# Patient Record
Sex: FEMALE | Race: OTHER | HISPANIC OR LATINO | URBAN - METROPOLITAN AREA
[De-identification: names, ages, dates, MRNs, and addresses within clinical notes are randomized per-mention and may not be internally consistent; named-entity substitution may affect disease eponyms.]

---
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
## 2019-10-21 ENCOUNTER — EMERGENCY (EMERGENCY)
Facility: HOSPITAL | Age: 19
LOS: 1 days | Discharge: HOME | End: 2019-10-21
Attending: EMERGENCY MEDICINE
Payer: MEDICAID

## 2019-10-21 VITALS
HEIGHT: 67 IN | OXYGEN SATURATION: 100 % | WEIGHT: 154.1 LBS | TEMPERATURE: 98 F | SYSTOLIC BLOOD PRESSURE: 106 MMHG | RESPIRATION RATE: 20 BRPM | HEART RATE: 77 BPM | DIASTOLIC BLOOD PRESSURE: 70 MMHG

## 2019-10-21 LAB
APPEARANCE UR: CLEAR — SIGNIFICANT CHANGE UP
BILIRUB UR-MCNC: NEGATIVE — SIGNIFICANT CHANGE UP
COLOR SPEC: YELLOW — SIGNIFICANT CHANGE UP
DIFF PNL FLD: NEGATIVE — SIGNIFICANT CHANGE UP
GLUCOSE UR QL: NEGATIVE MG/DL — SIGNIFICANT CHANGE UP
HCG UR QL: NEGATIVE — SIGNIFICANT CHANGE UP
KETONES UR-MCNC: NEGATIVE — SIGNIFICANT CHANGE UP
LEUKOCYTE ESTERASE UR-ACNC: NEGATIVE — SIGNIFICANT CHANGE UP
NITRITE UR-MCNC: NEGATIVE — SIGNIFICANT CHANGE UP
PH UR: 8 — SIGNIFICANT CHANGE UP (ref 5–8)
PROT UR-MCNC: NEGATIVE MG/DL — SIGNIFICANT CHANGE UP
SP GR SPEC: 1.01 — SIGNIFICANT CHANGE UP (ref 1.01–1.02)
UROBILINOGEN FLD QL: NEGATIVE MG/DL — SIGNIFICANT CHANGE UP

## 2019-10-21 PROCEDURE — 99283 EMERGENCY DEPT VISIT LOW MDM: CPT

## 2019-10-21 PROCEDURE — T1013: CPT

## 2019-10-21 PROCEDURE — 81003 URINALYSIS AUTO W/O SCOPE: CPT

## 2019-10-21 PROCEDURE — 81025 URINE PREGNANCY TEST: CPT

## 2019-10-21 RX ORDER — MECLIZINE HCL 12.5 MG
25 TABLET ORAL ONCE
Refills: 0 | Status: COMPLETED | OUTPATIENT
Start: 2019-10-21 | End: 2019-10-21

## 2019-10-21 RX ORDER — ONDANSETRON 8 MG/1
4 TABLET, FILM COATED ORAL ONCE
Refills: 0 | Status: COMPLETED | OUTPATIENT
Start: 2019-10-21 | End: 2019-10-21

## 2019-10-21 RX ADMIN — Medication 25 MILLIGRAM(S): at 12:46

## 2019-10-21 RX ADMIN — ONDANSETRON 4 MILLIGRAM(S): 8 TABLET, FILM COATED ORAL at 12:46

## 2019-10-21 NOTE — ED STATDOCS - PROGRESS NOTE DETAILS
Periorbital Skin Units: 12 PA NOTE: Pt seen by intake physician and HPI/ROS/PE/MDM reviewed. Pt seen and evaluated. Discussed plan and any resulted studies at this time. Will continue to monitor and re-evaluate. UA negative, symptoms resolved and no pelvic tenderness on reexamination. Patient to be discharged from ED. Any available test results were discussed with patient and/or family. Verbal instructions given, including instructions to return to ED immediately for any new, worsening, or concerning symptoms. Patient endorsed understanding. Written discharge instructions additionally given, including follow-up plan.

## 2019-10-21 NOTE — ED STATDOCS - PATIENT PORTAL LINK FT
You can access the FollowMyHealth Patient Portal offered by [facility] by registering at the following website: [email or web address]. By joining [employer]’s FollowMyHealth portal, you will also be able to view your health information using other applications (apps) compatible with our system.

## 2019-10-21 NOTE — ED STATDOCS - CARE PROVIDER_API CALL
Eva Smith)  Obstetrics and Gynecology  [address] [place] [address]  Phone: [phone]  Fax: [phone]  Follow Up Time: 1-3 Days

## 2019-10-21 NOTE — ED STATDOCS - CLINICAL SUMMARY MEDICAL DECISION MAKING FREE TEXT BOX
1. Vertigo, likely periphreal. Will treat with Meclizine.  2. Dysuria. Will check UA 1. Vertigo, likely periphreal. Will treat with Meclizine.  2. Dysuria. Will check UA  3. epigastric buring sensation: likely gastritis. dietary modfications suggested and recommended H2 blocker

## 2019-10-21 NOTE — ED STATDOCS - NSFOLLOWUPINSTRUCTIONS_ED_ALL_ED_FT
Please follow up with your primary care physician within 24-72 hours and return immediately if symptoms worsen.    Pelvic Pain, Female    Female pelvic pain can be caused by many different things and start from a variety of places. Pelvic pain refers to pain that is located in the lower half of the abdomen and between your hips. The pain may occur over a short period of time (acute) or may be reoccurring (chronic). The cause of pelvic pain may be related to disorders affecting the female reproductive organs (gynecologic), but it may also be related to the bladder, kidney stones, an intestinal complication, or muscle or skeletal problems. Getting help right away for pelvic pain is important, especially if there has been severe, sharp, or a sudden onset of unusual pain. It is also important to get help right away because some types of pelvic pain can be life threatening.     CAUSES  Below are only some of the causes of pelvic pain. The causes of pelvic pain can be in one of several categories.     Gynecologic.   Pelvic inflammatory disease.  Sexually transmitted infection.  Ovarian cyst or a twisted ovarian ligament (ovarian torsion).  Uterine lining that grows outside the uterus (endometriosis).  Fibroids, cysts, or tumors.  Ovulation.   Pregnancy.  Pregnancy that occurs outside the uterus (ectopic pregnancy).  Miscarriage.   Labor.  Abruption of the placenta or ruptured uterus.  Infection.  Uterine infection (endometritis).  Bladder infection.  Diverticulitis.  Miscarriage related to a uterine infection (septic ).  Bladder.  Inflammation of the bladder (cystitis).  Kidney stone(s).  Gastrointestinal.  Constipation.  Diverticulitis.  Neurologic.  Trauma.  Feeling pelvic pain because of mental or emotional causes (psychosomatic).   Cancers of the bowel or pelvis.     EVALUATION  Your caregiver will want to take a careful history of your concerns. This includes recent changes in your health, a careful gynecologic history of your periods (menses), and a sexual history. Obtaining your family history and medical history is also important. Your caregiver may suggest a pelvic exam. A pelvic exam will help identify the location and severity of the pain. It also helps in the evaluation of which organ system may be involved. In order to identify the cause of the pelvic pain and be properly treated, your caregiver may order tests. These tests may include:     A pregnancy test.  Pelvic ultrasonography.  An X-ray exam of the abdomen.  A urinalysis or evaluation of vaginal discharge.  Blood tests.     HOME CARE INSTRUCTIONS  Only take over-the-counter or prescription medicines for pain, discomfort, or fever as directed by your caregiver.    Rest as directed by your caregiver.    Eat a balanced diet.    Drink enough fluids to make your urine clear or pale yellow, or as directed.    Avoid sexual intercourse if it causes pain.    Apply warm or cold compresses to the lower abdomen depending on which one helps the pain.    Avoid stressful situations.    Keep a journal of your pelvic pain. Write down when it started, where the pain is located, and if there are things that seem to be associated with the pain, such as food or your menstrual cycle.  Follow up with your caregiver as directed.       SEEK MEDICAL CARE IF:  Your medicine does not help your pain.  You have abnormal vaginal discharge.     SEEK IMMEDIATE MEDICAL CARE IF:  You have heavy bleeding from the vagina.    Your pelvic pain increases.    You feel light-headed or faint.    You have chills.    You have pain with urination or blood in your urine.    You have uncontrolled diarrhea or vomiting.    You have a fever or persistent symptoms for more than 3 days.  You have a fever and your symptoms suddenly get worse.    You are being physically or sexually abused.

## 2019-10-21 NOTE — ED STATDOCS - OBJECTIVE STATEMENT
Pt is  18 y/o F presenting to the ED with c/o abd pain, onset this morning. Pt developed burning sensation in her epigastric abd that has been constant since. Associated dizziness (mostly presents with changes in position) described as a room spinning, nausea, emesis x4, and chills. Pt also with dysuria for the last two weeks. Denies fever. No sick contacts or travel. Denies smoking, drug, and alcohol use.   Mary Ann- PMJENNIFER   LMP 10/14/2019. Pt is  18 y/o F presenting to the ED with c/o "feeling dizzy since this morning". Reports dizziness (mostly presents with changes in position) described as a room spinning, nausea, emesis x4, and chills.  Also notes constant burning sensation in epigastrium since this morning. Pt also reports dysuria for the last two weeks. Denies fever. No sick contacts or travel. Denies smoking, drug, and alcohol use.   Mary Ann- PMJENNIFER   LMP 10/14/2019.

## 2019-10-21 NOTE — ED STATDOCS - CARE PLAN
Principal Discharge DX:	Peripheral vertigo, unspecified laterality  Secondary Diagnosis:	Epigastric pain

## 2020-03-03 ENCOUNTER — EMERGENCY (EMERGENCY)
Facility: HOSPITAL | Age: 20
LOS: 1 days | Discharge: HOME | End: 2020-03-03
Attending: EMERGENCY MEDICINE
Payer: COMMERCIAL

## 2020-03-03 VITALS
SYSTOLIC BLOOD PRESSURE: 114 MMHG | HEART RATE: 78 BPM | OXYGEN SATURATION: 100 % | WEIGHT: 156.97 LBS | RESPIRATION RATE: 16 BRPM | DIASTOLIC BLOOD PRESSURE: 78 MMHG | TEMPERATURE: 98 F | HEIGHT: 67 IN

## 2020-03-03 PROBLEM — N39.0 URINARY TRACT INFECTION, SITE NOT SPECIFIED: Chronic | Status: ACTIVE | Noted: 2019-10-21

## 2020-03-03 LAB
ALBUMIN SERPL ELPH-MCNC: 4.2 G/DL — SIGNIFICANT CHANGE UP (ref 3.3–5.2)
ALP SERPL-CCNC: 81 U/L — SIGNIFICANT CHANGE UP (ref 40–120)
ALT FLD-CCNC: 30 U/L — SIGNIFICANT CHANGE UP
ANION GAP SERPL CALC-SCNC: 12 MMOL/L — SIGNIFICANT CHANGE UP (ref 5–17)
AST SERPL-CCNC: 29 U/L — SIGNIFICANT CHANGE UP
BASOPHILS # BLD AUTO: 0.05 K/UL — SIGNIFICANT CHANGE UP (ref 0–0.2)
BASOPHILS NFR BLD AUTO: 0.6 % — SIGNIFICANT CHANGE UP (ref 0–2)
BILIRUB SERPL-MCNC: 0.2 MG/DL — LOW (ref 0.4–2)
BUN SERPL-MCNC: 11 MG/DL — SIGNIFICANT CHANGE UP (ref 8–20)
CALCIUM SERPL-MCNC: 9.1 MG/DL — SIGNIFICANT CHANGE UP (ref 8.6–10.2)
CHLORIDE SERPL-SCNC: 107 MMOL/L — SIGNIFICANT CHANGE UP (ref 98–107)
CO2 SERPL-SCNC: 22 MMOL/L — SIGNIFICANT CHANGE UP (ref 22–29)
CREAT SERPL-MCNC: 0.52 MG/DL — SIGNIFICANT CHANGE UP (ref 0.5–1.3)
EOSINOPHIL # BLD AUTO: 0.14 K/UL — SIGNIFICANT CHANGE UP (ref 0–0.5)
EOSINOPHIL NFR BLD AUTO: 1.6 % — SIGNIFICANT CHANGE UP (ref 0–6)
GLUCOSE SERPL-MCNC: 86 MG/DL — SIGNIFICANT CHANGE UP (ref 70–99)
HCG SERPL-ACNC: <4 MIU/ML — SIGNIFICANT CHANGE UP
HCT VFR BLD CALC: 35.2 % — SIGNIFICANT CHANGE UP (ref 34.5–45)
HGB BLD-MCNC: 10.8 G/DL — LOW (ref 11.5–15.5)
IMM GRANULOCYTES NFR BLD AUTO: 0.3 % — SIGNIFICANT CHANGE UP (ref 0–1.5)
LYMPHOCYTES # BLD AUTO: 1.13 K/UL — SIGNIFICANT CHANGE UP (ref 1–3.3)
LYMPHOCYTES # BLD AUTO: 12.6 % — LOW (ref 13–44)
MCHC RBC-ENTMCNC: 22.9 PG — LOW (ref 27–34)
MCHC RBC-ENTMCNC: 30.7 GM/DL — LOW (ref 32–36)
MCV RBC AUTO: 74.6 FL — LOW (ref 80–100)
MONOCYTES # BLD AUTO: 0.53 K/UL — SIGNIFICANT CHANGE UP (ref 0–0.9)
MONOCYTES NFR BLD AUTO: 5.9 % — SIGNIFICANT CHANGE UP (ref 2–14)
NEUTROPHILS # BLD AUTO: 7.07 K/UL — SIGNIFICANT CHANGE UP (ref 1.8–7.4)
NEUTROPHILS NFR BLD AUTO: 79 % — HIGH (ref 43–77)
PLATELET # BLD AUTO: 209 K/UL — SIGNIFICANT CHANGE UP (ref 150–400)
POTASSIUM SERPL-MCNC: 4 MMOL/L — SIGNIFICANT CHANGE UP (ref 3.5–5.3)
POTASSIUM SERPL-SCNC: 4 MMOL/L — SIGNIFICANT CHANGE UP (ref 3.5–5.3)
PROT SERPL-MCNC: 7 G/DL — SIGNIFICANT CHANGE UP (ref 6.6–8.7)
RBC # BLD: 4.72 M/UL — SIGNIFICANT CHANGE UP (ref 3.8–5.2)
RBC # FLD: 13.6 % — SIGNIFICANT CHANGE UP (ref 10.3–14.5)
SODIUM SERPL-SCNC: 141 MMOL/L — SIGNIFICANT CHANGE UP (ref 135–145)
T4 AB SER-ACNC: 8 UG/DL — SIGNIFICANT CHANGE UP (ref 4.5–12)
TROPONIN T SERPL-MCNC: <0.01 NG/ML — SIGNIFICANT CHANGE UP (ref 0–0.06)
TSH SERPL-MCNC: 1.06 UIU/ML — SIGNIFICANT CHANGE UP (ref 0.27–4.2)
WBC # BLD: 8.95 K/UL — SIGNIFICANT CHANGE UP (ref 3.8–10.5)
WBC # FLD AUTO: 8.95 K/UL — SIGNIFICANT CHANGE UP (ref 3.8–10.5)

## 2020-03-03 PROCEDURE — 84443 ASSAY THYROID STIM HORMONE: CPT

## 2020-03-03 PROCEDURE — 36415 COLL VENOUS BLD VENIPUNCTURE: CPT

## 2020-03-03 PROCEDURE — 99285 EMERGENCY DEPT VISIT HI MDM: CPT

## 2020-03-03 PROCEDURE — 93010 ELECTROCARDIOGRAM REPORT: CPT

## 2020-03-03 PROCEDURE — T1013: CPT

## 2020-03-03 PROCEDURE — 80053 COMPREHEN METABOLIC PANEL: CPT

## 2020-03-03 PROCEDURE — 99283 EMERGENCY DEPT VISIT LOW MDM: CPT

## 2020-03-03 PROCEDURE — 85027 COMPLETE CBC AUTOMATED: CPT

## 2020-03-03 PROCEDURE — 93005 ELECTROCARDIOGRAM TRACING: CPT

## 2020-03-03 PROCEDURE — 84436 ASSAY OF TOTAL THYROXINE: CPT

## 2020-03-03 PROCEDURE — 84702 CHORIONIC GONADOTROPIN TEST: CPT

## 2020-03-03 PROCEDURE — 84484 ASSAY OF TROPONIN QUANT: CPT

## 2020-03-03 RX ORDER — SODIUM CHLORIDE 9 MG/ML
1000 INJECTION INTRAMUSCULAR; INTRAVENOUS; SUBCUTANEOUS ONCE
Refills: 0 | Status: COMPLETED | OUTPATIENT
Start: 2020-03-03 | End: 2020-03-03

## 2020-03-03 RX ADMIN — SODIUM CHLORIDE 1000 MILLILITER(S): 9 INJECTION INTRAMUSCULAR; INTRAVENOUS; SUBCUTANEOUS at 11:22

## 2020-03-03 NOTE — ED STATDOCS - CARE PROVIDER_API CALL
Kd Cavanaugh)  Cardiovascular Disease  [facility], [address] [id number]  Phone: [phone]  Fax: [phone]  Follow Up Time:

## 2020-03-03 NOTE — ED STATDOCS - ATTENDING CONTRIBUTION TO CARE
I, Casper Smith, performed a face to face bedside interview with this patient regarding history of present illness, review of symptoms and relevant past medical, social and family history.  I completed an independent physical examination. I have communicated the patient’s plan of care and disposition with the resident

## 2020-03-03 NOTE — ED STATDOCS - CLINICAL SUMMARY MEDICAL DECISION MAKING FREE TEXT BOX
Pt with palpitations and reports of near syncope episodes; will give fluids, check labs, troponin, EKG, place on monitor, and will reassess.

## 2020-03-03 NOTE — ED STATDOCS - INTERPRETATION
Device is working normally. 25 brief mode switching episodes (all <5 minutes).  Normal sensing and capture of RA and RV leads; stable impedances. Battery longevity is 7.3-9.3 years.     Changes today include decreasing RA output to 2.0 volts.    FU in 6 months for next PM check with me.    Collaborating MD: Rene   sinus arrhthmia, irregulalrly regular rhythm, expected variation with respiratory variation/normal sinus rhythm

## 2020-03-03 NOTE — ED STATDOCS - PATIENT PORTAL LINK FT
You can access the FollowMyHealth Patient Portal offered by [facility] by registering at the following website: [email or web address]. By joining [employer]’s FollowMyHealth portal, you will also be able to view your health information using other applications (apps) compatible with our system.

## 2020-03-03 NOTE — ED STATDOCS - OBJECTIVE STATEMENT
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 Pt states that she has been experiencing episodes where she feels as if her heart is racing, and then she suddenly begins to "see black," and feel as if she is about to faint. Reports that episodes are instigated when pt feels angry or becomes upset. Pt states that after she begins to have palpitations, "everything gets dark," but notes that she has not lost consciousness or fallen down at all in the past 2 weeks. Denies any loss of consciousness, vomiting, diarrhea, headache, eye pain, chest pain, shortness of breath, cough, numbness/tingling/weakness.   : Elizabeth

## 2020-03-03 NOTE — ED STATDOCS - NSFOLLOWUPINSTRUCTIONS_ED_ALL_ED_FT
Palpitaciones  (Palpitations)    La palpitación es la sensación de que el corazón presenta estas características:    Tiene latidos cardíacos irregulares.  Late más rápido de lo normal.  Tiene un aleteo.  Saltea un latido cardíaco.    Generalmente no es un problema grave. En algunos casos podría necesitar hacer más estudios diagnósticos.     CUIDADOS EN EL HOGAR  Evite lo siguiente:  La cafeína que contienen el café, el té, las gaseosas, los diuréticos y las bebidas energizantes.  Chocolate.  Alcohol.  No use productos que contengan tabaco. Estos incluyen cigarrillos, tabaco para mascar y cigarrillos electrónicos. Si necesita ayuda para dejar de fumar, consulte al médico.  Trate de reducir los niveles de estrés. Lo siguiente puede ser de ayuda:  Practicar yoga.  Meditación.  Realizar actividad física. Nadar, trotar y caminar son buenas opciones.  Un método que ayuda a usar la mente para controlar cosas del cuerpo, lino los latidos cardíacos (biorregulación).  Descanse y duerma lo suficiente.  Dale los medicamentos de venta charissa y los recetados solamente lino se lo haya indicado el médico.  Concurra a todas las visitas de control lino se lo haya indicado el médico. [place] es importante.    SOLICITE AYUDA SI:  Bush corazón sigue latiendo rápido o de forma irregular después de 24 horas.  Las palpitaciones le suceden con más frecuencia.    SOLICITE AYUDA DE INMEDIATO SI:  Siente dolor en el pecho.  Le falta el aire.  Siente un dolor de fabian muy intenso.  Siente mareos.  Pierde el conocimiento (se desmaya).    Lizzy un seguimiento con cardiología esta semana. Busque atención médica inmediata para los síntomas nuevos o que empeoran.    --------------------------------------------------------------------------------------------------------    Palpitations    A palpitation is the feeling that your heart:     Has an uneven (irregular) heartbeat.  Is beating faster than normal.  Is fluttering.  Is skipping a beat.    This is usually not a serious problem. In some cases, you may need more medical tests.    Follow these instructions at home:  Avoid:  Caffeine in coffee, tea, soft drinks, diet pills, and energy drinks.  Chocolate.  Alcohol.  Do not use any tobacco products. These include cigarettes, chewing tobacco, and e-cigarettes. If you need help quitting, ask your doctor.  Try to reduce your stress. These things may help:  Yoga.  Meditation.  Physical activity. Swimming, jogging, and walking are good choices.  A method that helps you use your mind to control things in your body, like heartbeats (biofeedback).  Get plenty of rest and sleep.  Take over-the-counter and prescription medicines only as told by your doctor.  Keep all follow-up visits as told by your doctor. This is important.    Contact a doctor if:  Your heartbeat is still fast or uneven after 24 hours.  Your palpitations occur more often.    Get help right away if:  You have chest pain.  You feel short of breath.  You have a very bad headache.  You feel dizzy.  You pass out (faint).    ADDITIONAL NOTES AND INSTRUCTIONS    Please follow up with your Primary MD in 24-48 hr.  Seek immediate medical care for any new/worsening signs or symptoms.    Follow up with cardiology this week. Seek immediate medical attention for new or worsening symptoms.

## 2020-03-03 NOTE — ED ADULT TRIAGE NOTE - CHIEF COMPLAINT QUOTE
Pt with palpitations and syncopal episodes over the last 2 weeks. Pt had palpitation and near syncopal episode this morning.

## 2020-03-03 NOTE — ED STATDOCS - PROGRESS NOTE DETAILS
Divya Robbins, Resident: Reassessed patient, patient asymptomatic, physical exam unchanged. Pt ambulating without any difficulty. Return precautions given, will discharge with cardiology follow up. Sarah: pt states that she feels much better, vision is at baseline, neuro intact. Stable for dc and cardio f/u

## 2020-05-21 ENCOUNTER — EMERGENCY (EMERGENCY)
Facility: HOSPITAL | Age: 20
LOS: 1 days | Discharge: HOME | End: 2020-05-21
Attending: EMERGENCY MEDICINE
Payer: COMMERCIAL

## 2020-05-21 VITALS
DIASTOLIC BLOOD PRESSURE: 81 MMHG | OXYGEN SATURATION: 97 % | TEMPERATURE: 99 F | SYSTOLIC BLOOD PRESSURE: 132 MMHG | WEIGHT: 134.92 LBS | RESPIRATION RATE: 17 BRPM | HEART RATE: 86 BPM

## 2020-05-21 LAB
ALBUMIN SERPL ELPH-MCNC: 4.7 G/DL — SIGNIFICANT CHANGE UP (ref 3.3–5.2)
ALP SERPL-CCNC: 78 U/L — SIGNIFICANT CHANGE UP (ref 40–120)
ALT FLD-CCNC: 21 U/L — SIGNIFICANT CHANGE UP
ANION GAP SERPL CALC-SCNC: 12 MMOL/L — SIGNIFICANT CHANGE UP (ref 5–17)
APPEARANCE UR: CLEAR — SIGNIFICANT CHANGE UP
AST SERPL-CCNC: 24 U/L — SIGNIFICANT CHANGE UP
BASOPHILS # BLD AUTO: 0.05 K/UL — SIGNIFICANT CHANGE UP (ref 0–0.2)
BASOPHILS NFR BLD AUTO: 0.6 % — SIGNIFICANT CHANGE UP (ref 0–2)
BILIRUB SERPL-MCNC: <0.2 MG/DL — LOW (ref 0.4–2)
BILIRUB UR-MCNC: NEGATIVE — SIGNIFICANT CHANGE UP
BUN SERPL-MCNC: 8 MG/DL — SIGNIFICANT CHANGE UP (ref 8–20)
CALCIUM SERPL-MCNC: 9.7 MG/DL — SIGNIFICANT CHANGE UP (ref 8.6–10.2)
CHLORIDE SERPL-SCNC: 100 MMOL/L — SIGNIFICANT CHANGE UP (ref 98–107)
CO2 SERPL-SCNC: 23 MMOL/L — SIGNIFICANT CHANGE UP (ref 22–29)
COLOR SPEC: YELLOW — SIGNIFICANT CHANGE UP
CREAT SERPL-MCNC: 0.6 MG/DL — SIGNIFICANT CHANGE UP (ref 0.5–1.3)
DIFF PNL FLD: NEGATIVE — SIGNIFICANT CHANGE UP
EOSINOPHIL # BLD AUTO: 0.15 K/UL — SIGNIFICANT CHANGE UP (ref 0–0.5)
EOSINOPHIL NFR BLD AUTO: 1.9 % — SIGNIFICANT CHANGE UP (ref 0–6)
GLUCOSE SERPL-MCNC: 95 MG/DL — SIGNIFICANT CHANGE UP (ref 70–99)
GLUCOSE UR QL: NEGATIVE MG/DL — SIGNIFICANT CHANGE UP
HCG SERPL-ACNC: HIGH MIU/ML
HCT VFR BLD CALC: 37.7 % — SIGNIFICANT CHANGE UP (ref 34.5–45)
HGB BLD-MCNC: 11.7 G/DL — SIGNIFICANT CHANGE UP (ref 11.5–15.5)
IMM GRANULOCYTES NFR BLD AUTO: 0.3 % — SIGNIFICANT CHANGE UP (ref 0–1.5)
KETONES UR-MCNC: NEGATIVE — SIGNIFICANT CHANGE UP
LEUKOCYTE ESTERASE UR-ACNC: NEGATIVE — SIGNIFICANT CHANGE UP
LYMPHOCYTES # BLD AUTO: 1.47 K/UL — SIGNIFICANT CHANGE UP (ref 1–3.3)
LYMPHOCYTES # BLD AUTO: 18.6 % — SIGNIFICANT CHANGE UP (ref 13–44)
MCHC RBC-ENTMCNC: 23.2 PG — LOW (ref 27–34)
MCHC RBC-ENTMCNC: 31 GM/DL — LOW (ref 32–36)
MCV RBC AUTO: 74.8 FL — LOW (ref 80–100)
MONOCYTES # BLD AUTO: 0.54 K/UL — SIGNIFICANT CHANGE UP (ref 0–0.9)
MONOCYTES NFR BLD AUTO: 6.8 % — SIGNIFICANT CHANGE UP (ref 2–14)
NEUTROPHILS # BLD AUTO: 5.67 K/UL — SIGNIFICANT CHANGE UP (ref 1.8–7.4)
NEUTROPHILS NFR BLD AUTO: 71.8 % — SIGNIFICANT CHANGE UP (ref 43–77)
NITRITE UR-MCNC: NEGATIVE — SIGNIFICANT CHANGE UP
PH UR: 7 — SIGNIFICANT CHANGE UP (ref 5–8)
PLATELET # BLD AUTO: 240 K/UL — SIGNIFICANT CHANGE UP (ref 150–400)
POTASSIUM SERPL-MCNC: 4 MMOL/L — SIGNIFICANT CHANGE UP (ref 3.5–5.3)
POTASSIUM SERPL-SCNC: 4 MMOL/L — SIGNIFICANT CHANGE UP (ref 3.5–5.3)
PROT SERPL-MCNC: 8 G/DL — SIGNIFICANT CHANGE UP (ref 6.6–8.7)
PROT UR-MCNC: NEGATIVE MG/DL — SIGNIFICANT CHANGE UP
RBC # BLD: 5.04 M/UL — SIGNIFICANT CHANGE UP (ref 3.8–5.2)
RBC # FLD: 14.2 % — SIGNIFICANT CHANGE UP (ref 10.3–14.5)
SODIUM SERPL-SCNC: 135 MMOL/L — SIGNIFICANT CHANGE UP (ref 135–145)
SP GR SPEC: 1.01 — SIGNIFICANT CHANGE UP (ref 1.01–1.02)
UROBILINOGEN FLD QL: NEGATIVE MG/DL — SIGNIFICANT CHANGE UP
WBC # BLD: 7.9 K/UL — SIGNIFICANT CHANGE UP (ref 3.8–10.5)
WBC # FLD AUTO: 7.9 K/UL — SIGNIFICANT CHANGE UP (ref 3.8–10.5)

## 2020-05-21 PROCEDURE — 76801 OB US < 14 WKS SINGLE FETUS: CPT | Mod: 26

## 2020-05-21 PROCEDURE — 76817 TRANSVAGINAL US OBSTETRIC: CPT | Mod: 26

## 2020-05-21 PROCEDURE — 36415 COLL VENOUS BLD VENIPUNCTURE: CPT

## 2020-05-21 PROCEDURE — 80053 COMPREHEN METABOLIC PANEL: CPT

## 2020-05-21 PROCEDURE — 87086 URINE CULTURE/COLONY COUNT: CPT

## 2020-05-21 PROCEDURE — 81003 URINALYSIS AUTO W/O SCOPE: CPT

## 2020-05-21 PROCEDURE — 99285 EMERGENCY DEPT VISIT HI MDM: CPT

## 2020-05-21 PROCEDURE — 76817 TRANSVAGINAL US OBSTETRIC: CPT

## 2020-05-21 PROCEDURE — 85027 COMPLETE CBC AUTOMATED: CPT

## 2020-05-21 PROCEDURE — T1013: CPT

## 2020-05-21 PROCEDURE — 84702 CHORIONIC GONADOTROPIN TEST: CPT

## 2020-05-21 PROCEDURE — 99284 EMERGENCY DEPT VISIT MOD MDM: CPT

## 2020-05-21 PROCEDURE — 76801 OB US < 14 WKS SINGLE FETUS: CPT

## 2020-05-21 PROCEDURE — 87186 SC STD MICRODIL/AGAR DIL: CPT

## 2020-05-21 NOTE — ED ADULT TRIAGE NOTE - STATUS:
Information was given to Dr. Donna Rainey.  
Patient scheduled an appointment with Dr. Meza on 7/19/19.  Patient had appointment on 7/23 and [facility] scheduling department contacted patient directly.  Per Donna Rainey MD request.      
[employer]

## 2020-05-21 NOTE — ED STATDOCS - PHYSICAL EXAMINATION
Gen: No acute distress, non toxic  HEENT: Mucous membranes moist, pink conjunctivae, EOMI  CV: RRR, nl s1/s2.  Resp: CTAB, normal rate and effort  GI: abd soft, no rebound/guarding/rigidity. mild suprapubic ttp generalized.   : No CVAT  Neuro: A&O x 3, moving all 4 extremities  MSK: No spine or joint tenderness to palpation  Skin: No rashes. intact and perfused.

## 2020-05-21 NOTE — ED STATDOCS - CLINICAL SUMMARY MEDICAL DECISION MAKING FREE TEXT BOX
1 week lower abd/back discomfort and dysuria with positive home preg test and lmp ~7 weeks prior. will check labs, u/s, urine, reassess.

## 2020-05-21 NOTE — ED STATDOCS - OBJECTIVE STATEMENT
ED  18 y/o female  at ~7 weeks by LMP () c/o lower abd cramping and lower back discomfort for 1 week. States positive home preg test 2 weeks ago, no u/s yet, no vag bleeding/abnl discharge, no f/c, no vomiting/diarrhea. Pain is generalized lower abd, slightly worse on right and right flank. +dysuria. has yet to see ob/gyn. no other complaints.     ROS: No fever/chills. No eye pain/changes in vision, No ear pain/sore throat/dysphagia, No chest pain/palpitations. No SOB/cough/. No N/V/D, no black/bloody bm. No frequency/discharge, No headache. No Dizziness.    No rashes or breaks in skin. No numbness/tingling/weakness.

## 2020-05-21 NOTE — ED STATDOCS - NSFOLLOWUPINSTRUCTIONS_ED_ALL_ED_FT
- Lizzy un seguimiento con [facility] médico dentro de 2-3 días.  - Regrese al servicio de urgencias por cualquier síntoma nuevo o que empeore.  - Seguimiento con el médico OBGYN dentro de 2-3 días para prakash evaluación adicional

## 2020-05-21 NOTE — ED STATDOCS - ATTENDING CONTRIBUTION TO CARE
Stephanie: I performed a face to face bedside interview with patient regarding history of present illness, review of symptoms and past medical history. I completed an independent physical exam and ordered tests/medications as needed.  I have discussed patient's plan of care with advanced care provider. The advanced care provider assisted in  executing the discussed plan. I was available for any questions or issues that may have arose during the execution of the plan of care.

## 2020-05-21 NOTE — ED STATDOCS - PROGRESS NOTE DETAILS
PA note: PT evaluated by intake physician. HPI/PE/ROS as noted above. Will follow up plan per intake physician. Pending US PA note: Pt reassessed at this time. Pt feeling well, in NAD. abdomen soft, non-tender. US showing single live IUP 7 weeks 1 day, small subchorionic hemorrhage. Also noting R corpus luteal cyst with hemorrhagic content measuring 1.7 cm. All results discussed at length with pt, all questions answered. Pt provided copy of all results and instructed to f/u with OBGYN, referrals provided. Return precautions given, stable for dc.  : Sheeba

## 2020-05-21 NOTE — ED STATDOCS - CARE PROVIDER_API CALL
Vasyl Yan  OBSTETRICS AND GYNECOLOGY  [address] [place] [address]  Phone: [phone]  Fax: [phone]  Follow Up Time:     Murray Vaughan  OBSTETRICS AND GYNECOLOGY  [address] [place] [address]  Phone: [phone]  Fax: [phone]  Follow Up Time:

## 2020-05-21 NOTE — ED STATDOCS - PATIENT PORTAL LINK FT
You can access the FollowMyHealth Patient Portal offered by [facility] by registering at the following website: [email or web address]. By joining [employer]’s FollowMyHealth portal, you will also be able to view your health information using other applications (apps) compatible with our system.

## 2020-05-21 NOTE — ED ADULT TRIAGE NOTE - CHIEF COMPLAINT QUOTE
Right sided abdominal pain and pain with urination.  Pt states she is pregnant, LMP 4/1. Right sided lower abdominal pain. right flank pain and pain with urination.  Pt states she is pregnant, LMP 4/1.

## 2020-05-24 LAB
-  AMIKACIN: SIGNIFICANT CHANGE UP
-  AMPICILLIN/SULBACTAM: SIGNIFICANT CHANGE UP
-  AMPICILLIN: SIGNIFICANT CHANGE UP
-  AMPICILLIN: SIGNIFICANT CHANGE UP
-  AZTREONAM: SIGNIFICANT CHANGE UP
-  CEFAZOLIN: SIGNIFICANT CHANGE UP
-  CEFEPIME: SIGNIFICANT CHANGE UP
-  CEFOXITIN: SIGNIFICANT CHANGE UP
-  CEFTRIAXONE: SIGNIFICANT CHANGE UP
-  CIPROFLOXACIN: SIGNIFICANT CHANGE UP
-  CIPROFLOXACIN: SIGNIFICANT CHANGE UP
-  GENTAMICIN: SIGNIFICANT CHANGE UP
-  IMIPENEM: SIGNIFICANT CHANGE UP
-  LEVOFLOXACIN: SIGNIFICANT CHANGE UP
-  LEVOFLOXACIN: SIGNIFICANT CHANGE UP
-  MEROPENEM: SIGNIFICANT CHANGE UP
-  NITROFURANTOIN: SIGNIFICANT CHANGE UP
-  NITROFURANTOIN: SIGNIFICANT CHANGE UP
-  PIPERACILLIN/TAZOBACTAM: SIGNIFICANT CHANGE UP
-  TETRACYCLINE: SIGNIFICANT CHANGE UP
-  TIGECYCLINE: SIGNIFICANT CHANGE UP
-  TOBRAMYCIN: SIGNIFICANT CHANGE UP
-  TRIMETHOPRIM/SULFAMETHOXAZOLE: SIGNIFICANT CHANGE UP
-  VANCOMYCIN: SIGNIFICANT CHANGE UP
CULTURE RESULTS: SIGNIFICANT CHANGE UP
METHOD TYPE: SIGNIFICANT CHANGE UP
METHOD TYPE: SIGNIFICANT CHANGE UP
ORGANISM # SPEC MICROSCOPIC CNT: SIGNIFICANT CHANGE UP
SPECIMEN SOURCE: SIGNIFICANT CHANGE UP

## 2020-05-24 RX ORDER — CEPHALEXIN 500 MG
1 CAPSULE ORAL
Qty: 14 | Refills: 0
Start: 2020-05-24 | End: 2020-05-30

## 2020-08-20 ENCOUNTER — APPOINTMENT (OUTPATIENT)
Dept: ANTEPARTUM | Facility: CLINIC | Age: 20
End: 2020-08-20
Payer: MEDICAID

## 2020-08-20 ENCOUNTER — ASOB RESULT (OUTPATIENT)
Age: 20
End: 2020-08-20

## 2020-08-20 PROCEDURE — 76805 OB US >/= 14 WKS SNGL FETUS: CPT

## 2024-10-07 NOTE — ED STATDOCS - PSH
Left message for patient providing phone number to schedule colonoscopy.    Order for open access colonoscopy in place from 9/27/24   Patient due for colonoscopy and wants to get it scheduled, no order. Please advise.    No significant past surgical history 224 @266 - integrated classroom, with para

## 2025-01-03 NOTE — ED STATDOCS - CROS ED RESP ALL NEG
Discussed CT result with patient and spouse.  Reviewed recommendation from e-consult.  Will order Thoracic MRI.  
- - -

## 2025-02-10 NOTE — ED ADULT NURSE NOTE - NS PRO AD NO ADVANCE DIRECTIVE
CLINICAL INFORMATION:  30 years old, Female; cough. 



TECHNIQUE:  Frontal and lateral chest radiographs were obtained.



COMPARISON: None



FINDINGS:



Lungs: Clear.



Cardiac: Heart size is within normal limits.



Pulmonary vasculature: Unremarkable



Mediastinum/kamini: Within normal limits.



Bones: No evidence of acute osseous abnormality.



Other: No other significant finding.



IMPRESSION: 



No evidence of acute disease in the chest.



Electronically Signed by: Pradeep Duenas at 02/10/2025 08:00:43 AM
show
No